# Patient Record
Sex: FEMALE | Race: WHITE | Employment: PART TIME | URBAN - METROPOLITAN AREA
[De-identification: names, ages, dates, MRNs, and addresses within clinical notes are randomized per-mention and may not be internally consistent; named-entity substitution may affect disease eponyms.]

---

## 2017-11-02 ENCOUNTER — OFFICE VISIT (OUTPATIENT)
Dept: SURGERY | Age: 47
End: 2017-11-02

## 2017-11-02 VITALS
WEIGHT: 153 LBS | HEIGHT: 64 IN | SYSTOLIC BLOOD PRESSURE: 120 MMHG | DIASTOLIC BLOOD PRESSURE: 80 MMHG | BODY MASS INDEX: 26.12 KG/M2

## 2017-11-02 DIAGNOSIS — R10.31 RLQ ABDOMINAL PAIN: ICD-10-CM

## 2017-11-02 DIAGNOSIS — K40.91 RECURRENT RIGHT INGUINAL HERNIA: ICD-10-CM

## 2017-11-02 DIAGNOSIS — R10.31 RLQ ABDOMINAL PAIN: Primary | ICD-10-CM

## 2017-11-02 LAB
ANION GAP SERPL CALCULATED.3IONS-SCNC: 15 MMOL/L (ref 3–16)
BASOPHILS ABSOLUTE: 0.1 K/UL (ref 0–0.2)
BASOPHILS RELATIVE PERCENT: 0.7 %
BUN BLDV-MCNC: 12 MG/DL (ref 7–20)
CALCIUM SERPL-MCNC: 9.4 MG/DL (ref 8.3–10.6)
CHLORIDE BLD-SCNC: 99 MMOL/L (ref 99–110)
CO2: 26 MMOL/L (ref 21–32)
CREAT SERPL-MCNC: 0.7 MG/DL (ref 0.6–1.1)
EOSINOPHILS ABSOLUTE: 0.3 K/UL (ref 0–0.6)
EOSINOPHILS RELATIVE PERCENT: 3.9 %
GFR AFRICAN AMERICAN: >60
GFR NON-AFRICAN AMERICAN: >60
GLUCOSE BLD-MCNC: 76 MG/DL (ref 70–99)
HCT VFR BLD CALC: 43.2 % (ref 36–48)
HEMOGLOBIN: 14.4 G/DL (ref 12–16)
LYMPHOCYTES ABSOLUTE: 3.1 K/UL (ref 1–5.1)
LYMPHOCYTES RELATIVE PERCENT: 36.2 %
MCH RBC QN AUTO: 32.5 PG (ref 26–34)
MCHC RBC AUTO-ENTMCNC: 33.4 G/DL (ref 31–36)
MCV RBC AUTO: 97.3 FL (ref 80–100)
MONOCYTES ABSOLUTE: 0.5 K/UL (ref 0–1.3)
MONOCYTES RELATIVE PERCENT: 5.4 %
NEUTROPHILS ABSOLUTE: 4.6 K/UL (ref 1.7–7.7)
NEUTROPHILS RELATIVE PERCENT: 53.8 %
PDW BLD-RTO: 12.9 % (ref 12.4–15.4)
PLATELET # BLD: 261 K/UL (ref 135–450)
PMV BLD AUTO: 10 FL (ref 5–10.5)
POTASSIUM SERPL-SCNC: 4.3 MMOL/L (ref 3.5–5.1)
RBC # BLD: 4.44 M/UL (ref 4–5.2)
SODIUM BLD-SCNC: 140 MMOL/L (ref 136–145)
WBC # BLD: 8.6 K/UL (ref 4–11)

## 2017-11-02 PROCEDURE — 99243 OFF/OP CNSLTJ NEW/EST LOW 30: CPT | Performed by: SURGERY

## 2017-11-02 RX ORDER — NAPROXEN 500 MG/1
500 TABLET ORAL 2 TIMES DAILY WITH MEALS
COMMUNITY

## 2017-11-02 RX ORDER — NORETHINDRONE ACETATE AND ETHINYL ESTRADIOL 1MG-20(21)
1 KIT ORAL DAILY
COMMUNITY

## 2017-11-02 ASSESSMENT — ENCOUNTER SYMPTOMS
RESPIRATORY NEGATIVE: 1
ALLERGIC/IMMUNOLOGIC NEGATIVE: 1
GASTROINTESTINAL NEGATIVE: 1
EYES NEGATIVE: 1

## 2017-11-02 NOTE — PROGRESS NOTES
Negative. Genitourinary: Negative. Musculoskeletal: Negative. Skin: Negative. Allergic/Immunologic: Negative. Neurological: Negative. Hematological: Negative. Psychiatric/Behavioral: Negative. PHYSICAL EXAM:  VITALS:  /80   Ht 5' 4\" (1.626 m)   Wt 153 lb (69.4 kg)   BMI 26.26 kg/m²   CONSTITUTIONAL:  alert, no apparent distress and normal weight  EYES:  sclera clear  ENT:  normocepalic, without obvious abnormality  NECK:  supple, symmetrical, trachea midline  LUNGS:  clear to auscultation, no crackles or wheezing  CARDIOVASCULAR:  regular rate and rhythm  ABDOMEN:  scars noted are healed, normal bowel sounds, soft, non-distended, tenderness noted in the right lower quadrant and in the left lower quadrant, mildly voluntary guarding absent, no masses palpated, no hepatosplenomegally and hernia possible, recurrent right, has tenderness and scar / mesh in the area  MUSCULOSKELETAL:  0+ edema lower extremities  NEUROLOGIC:  Mental Status Exam:  Level of Alertness:   awake  Orientation:   person, place, time  SKIN:  no bruising or bleeding, normal skin color, texture, turgor, no redness, warmth, or swelling and no jaundice    DATA:     Ref.  Range 11/2/2017 13:15   Sodium Latest Ref Range: 136 - 145 mmol/L 140   Potassium Latest Ref Range: 3.5 - 5.1 mmol/L 4.3   Chloride Latest Ref Range: 99 - 110 mmol/L 99   CO2 Latest Ref Range: 21 - 32 mmol/L 26   BUN Latest Ref Range: 7 - 20 mg/dL 12   Creatinine Latest Ref Range: 0.6 - 1.1 mg/dL 0.7   Anion Gap Latest Ref Range: 3 - 16  15   GFR Non- Latest Ref Range: >60  >60   GFR  Latest Ref Range: >60  >60   Glucose Latest Ref Range: 70 - 99 mg/dL 76   Calcium Latest Ref Range: 8.3 - 10.6 mg/dL 9.4   WBC Latest Ref Range: 4.0 - 11.0 K/uL 8.6   RBC Latest Ref Range: 4.00 - 5.20 M/uL 4.44   Hemoglobin Quant Latest Ref Range: 12.0 - 16.0 g/dL 14.4   Hematocrit Latest Ref Range: 36.0 - 48.0 % 43.2   MCV Latest Ref

## 2017-11-16 ENCOUNTER — HOSPITAL ENCOUNTER (OUTPATIENT)
Dept: CT IMAGING | Age: 47
Discharge: HOME | End: 2017-11-16
Attending: SURGERY | Admitting: SURGERY

## 2017-11-16 DIAGNOSIS — R10.31 RIGHT LOWER QUADRANT PAIN: ICD-10-CM

## 2017-11-16 DIAGNOSIS — R10.31 RLQ ABDOMINAL PAIN: ICD-10-CM

## 2017-11-28 ENCOUNTER — OFFICE VISIT (OUTPATIENT)
Dept: SURGERY | Age: 47
End: 2017-11-28

## 2017-11-28 VITALS — WEIGHT: 154 LBS | BODY MASS INDEX: 26.43 KG/M2 | DIASTOLIC BLOOD PRESSURE: 68 MMHG | SYSTOLIC BLOOD PRESSURE: 116 MMHG

## 2017-11-28 DIAGNOSIS — R10.31 RLQ ABDOMINAL PAIN: ICD-10-CM

## 2017-11-28 DIAGNOSIS — R18.8 GROIN FLUID COLLECTION: Primary | ICD-10-CM

## 2017-11-28 PROCEDURE — 99213 OFFICE O/P EST LOW 20 MIN: CPT | Performed by: SURGERY

## 2017-11-28 NOTE — PROGRESS NOTES
[facility] GENERAL AND LAPAROSCOPIC SURGERY          PATIENT NAME: Andrew Rhodes     TODAY'S DATE: 11/28/2017    SUBJECTIVE:    Pt with continued right groin pain. No F/C. No skin change or drainage. Feels mass still in the area. Nl GI, .      OBJECTIVE:  VITALS:  /68   Wt 154 lb (69.9 kg)   BMI 26.43 kg/m²     CONSTITUTIONAL:  awake and alert  LUNGS:  clear to auscultation  ABDOMEN:  normal bowel sounds, soft, non-distended, tenderness noted right inguinal, localized groin mass present, tender     Data:    Radiology Review:  CT scan - reviewed with pt  Right groin fluid collection present      ASSESSMENT AND PLAN:  Fluid collection, seroma - right groin  Will aspirate today as large and symptomatic- 18 G - 10 ml aspirated  No hernia recurrence  See back in 6 weeks to recheck      [employer]

## 2017-12-12 ENCOUNTER — OFFICE VISIT (OUTPATIENT)
Dept: ORTHOPEDIC SURGERY | Age: 47
End: 2017-12-12

## 2017-12-12 VITALS
SYSTOLIC BLOOD PRESSURE: 114 MMHG | HEIGHT: 64 IN | DIASTOLIC BLOOD PRESSURE: 73 MMHG | HEART RATE: 74 BPM | BODY MASS INDEX: 26.46 KG/M2 | WEIGHT: 155 LBS | RESPIRATION RATE: 15 BRPM

## 2017-12-12 DIAGNOSIS — M79.671 BILATERAL FOOT PAIN: ICD-10-CM

## 2017-12-12 DIAGNOSIS — M79.672 BILATERAL FOOT PAIN: ICD-10-CM

## 2017-12-12 DIAGNOSIS — M79.672 LEFT FOOT PAIN: Primary | ICD-10-CM

## 2017-12-12 PROCEDURE — 73630 X-RAY EXAM OF FOOT: CPT | Performed by: ORTHOPAEDIC SURGERY

## 2017-12-12 PROCEDURE — 99203 OFFICE O/P NEW LOW 30 MIN: CPT | Performed by: ORTHOPAEDIC SURGERY

## 2017-12-12 RX ORDER — TRAMADOL HYDROCHLORIDE 50 MG/1
50 TABLET ORAL EVERY 8 HOURS PRN
Qty: 21 TABLET | Refills: 0 | Status: SHIPPED | OUTPATIENT
Start: 2017-12-12

## 2017-12-12 NOTE — PROGRESS NOTES
CHIEF COMPLAINT:   1-Right posterior heel pain/Justine's deformity with insertinal Achillis tendenosis. 2-Right ankle pain/ Ankle sprain. 3- Left foot pain/ possible 3rd MT stress fracture    HISTORY:  Ms. Zak Alvarez 52 y.o.  female presents today for the first visit for evaluation of right posterior heel pain and right lateral ankle pain which started after she fell down 2 steps in her garage in 7/2017. She had swelling and pain at the time with bruising. She still has swelling, but the bruising has improved.   She is complaining of achy  pain. Pain is increase with standing and walking and shoe wear. Rates pain a 7/10 VAS constant throbbing achy. Pain is sharp early in the morning with first few steps, dull achy pain by the end of the day. Two months after her fall in the garage, she developed increased pain in her left forefoot, plantar surface and now more on the top of her distal foot. Pain is sharp, constant and aching and not improving. Rates pain a 10/10 VAS. She went to urgent care on 11/24 and was told she had a strain. No radiation and no numbness and tingling sensation. No other complaint. 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 No past medical history on file. Past Surgical History:   Procedure Laterality Date    HERNIA REPAIR      HERNIA SURGERY    MANDIBLE FRACTURE SURGERY      2 jaw surgery TMJ PLUS 3 PIECE MAX    OVARY SURGERY      RIGHT       Social History     Social History    Marital status:      Spouse name: N/A    Number of children: N/A    Years of education: N/A     Occupational History    Not on file. Social History Main Topics    Smoking status: Never Smoker    Smokeless tobacco: Never Used    Alcohol use Yes    Drug use: Yes    Sexual activity: Not on file     Other Topics Concern    Not on file     Social History Narrative    No narrative on file       No family history on file.     Current Outpatient Prescriptions on File Prior to Visit   Medication Sig Dispense Refill    norethindrone-ethinyl estradiol (JUNEL FE 1/20) 1-20 MG-MCG per tablet Take 1 tablet by mouth daily      naproxen (NAPROSYN) 500 MG tablet Take 500 mg by mouth 2 times daily (with meals)       No current facility-administered medications on file prior to visit. Pertinent items are noted in HPI  Review of systems reviewed from Patient History Form dated on 12/12/2017 and available in the patient's chart under the Media tab. No change noted. PHYSICAL EXAMINATION:  Ms. Dawit Campos is a very pleasant 52 y.o.  female who presents today in no acute distress, awake, alert, and oriented. She is well dressed, nourished and  groomed. Patient with normal affect. Height is  5' 4\" (1.626 m), weight is 155 lb (70.3 kg), Body mass index is 26.61 kg/m². Resting respiratory rate is 16. Examination of the gait, showed that the patient walks heel-toe with a non-antalgic gait and no limp.  Examination of both ankles showing a good range of motion.  She has dorsiflexion to about 10 degrees bilaterally, which increased with knee flexion. She has intact sensation and good pedal pulses.  She has good strength in all four planes, including eversion, and has moderate tenderness on deep palpation over the right posterior calcaneal tubercle, with prominent Justine's compared to the other side.  The ankles are stable to drawer test bilaterally, equally.  She has moderate tenderness on deep palpation over the right ankle ATF. She has significant tenderness on deep palpation over the 3rd distal MT shaft left foot compared to the other side, with swelling. Pes cavus. IMAGING:  Xray's were reviewed.  3 views of the bilateral foot taken in office today, and showed no acute fracture. 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 Bilateral bunion. There is bony changes in the left 3rd MT distal shaft. Pes cavus.     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 2-Right ankle sprain  3-Left 3rd MT stress injury    PLAN: I discussed with the patient the treatment options. We recommended stretching exercises of the calf which was taught to the patient today. She will take NSAIDS Mobic. Use backless shoes. Since she has significant pain with bony changes in the left foot, recommend getting a MRI to further evaluate for stress fracture. Follow up results.        Sabino Olszewski, MD

## 2017-12-13 RX ORDER — MELOXICAM 7.5 MG/1
7.5 TABLET ORAL DAILY
Qty: 30 TABLET | Refills: 0 | Status: SHIPPED | OUTPATIENT
Start: 2017-12-13

## 2017-12-22 ENCOUNTER — HOSPITAL ENCOUNTER (OUTPATIENT)
Dept: MRI IMAGING | Age: 47
Discharge: HOME | End: 2017-12-22
Attending: ORTHOPAEDIC SURGERY | Admitting: ORTHOPAEDIC SURGERY

## 2017-12-22 DIAGNOSIS — M79.672 PAIN OF LEFT FOOT: ICD-10-CM

## 2017-12-22 DIAGNOSIS — M79.672 LEFT FOOT PAIN: ICD-10-CM

## 2017-12-26 DIAGNOSIS — M79.672 LEFT FOOT PAIN: ICD-10-CM

## 2017-12-26 DIAGNOSIS — M67.88 ACHILLES TENDINOSIS: Primary | ICD-10-CM

## 2017-12-26 NOTE — TELEPHONE ENCOUNTER
Patient calling to get her MRI results. She does not want to wait until Dr Kimberley Holt gets back. Please advise where to schedule and if asaf can see her.       Please call pt  [phone]

## 2017-12-28 RX ORDER — TRAMADOL HYDROCHLORIDE 50 MG/1
50 TABLET ORAL EVERY 8 HOURS PRN
Qty: 21 TABLET | Refills: 0 | Status: SHIPPED | OUTPATIENT
Start: 2017-12-28

## 2017-12-28 NOTE — TELEPHONE ENCOUNTER
Spoke with patient and advised that per SMA she had mild inflammation, no fracture and that he is suggesting PT. She was in agreement with this plan, she is in agreement with plan. She is also requesting a refill of tramadol. Patient will also need a follow up for 6 weeks, she did not want to make that appointment at this time.

## 2018-01-25 ENCOUNTER — OFFICE VISIT (OUTPATIENT)
Dept: SURGERY | Age: 48
End: 2018-01-25

## 2018-01-25 VITALS — BODY MASS INDEX: 25.75 KG/M2 | DIASTOLIC BLOOD PRESSURE: 70 MMHG | SYSTOLIC BLOOD PRESSURE: 119 MMHG | WEIGHT: 150 LBS

## 2018-01-25 DIAGNOSIS — R18.8 GROIN FLUID COLLECTION: Primary | ICD-10-CM

## 2018-01-25 PROCEDURE — 99213 OFFICE O/P EST LOW 20 MIN: CPT | Performed by: SURGERY

## 2018-02-27 ENCOUNTER — OFFICE VISIT (OUTPATIENT)
Dept: SURGERY | Age: 48
End: 2018-02-27

## 2018-02-27 VITALS — WEIGHT: 148 LBS | DIASTOLIC BLOOD PRESSURE: 72 MMHG | SYSTOLIC BLOOD PRESSURE: 118 MMHG | BODY MASS INDEX: 25.4 KG/M2

## 2018-02-27 DIAGNOSIS — R18.8 GROIN FLUID COLLECTION: Primary | ICD-10-CM

## 2018-02-27 PROCEDURE — 10160 PNXR ASPIR ABSC HMTMA BULLA: CPT | Performed by: SURGERY

## 2018-02-27 NOTE — PROGRESS NOTES
[facility] GENERAL AND LAPAROSCOPIC SURGERY          PATIENT NAME: Tom Jackson     TODAY'S DATE: 2/27/2018    SUBJECTIVE:    Pt with tender groin fluid recurrence. OBJECTIVE:  VITALS:  /72   Wt 148 lb (67.1 kg)   BMI 25.40 kg/m²     CONSTITUTIONAL:  awake and alert    ABDOMEN:  normal bowel sounds, soft, non-distended, tenderness noted in old right inguinal hernia region, with underlying fluid collection     Data:  CBC: No results for input(s): WBC, HGB, HCT, PLT in the last 72 hours. BMP:  No results for input(s): NA, K, CL, CO2, BUN, CREATININE, GLUCOSE in the last 72 hours. Hepatic: No results for input(s): AST, ALT, ALB, BILITOT, ALKPHOS in the last 72 hours. Mag:    No results for input(s): MG in the last 72 hours. Phos:   No results for input(s): PHOS in the last 72 hours. INR: No results for input(s): INR in the last 72 hours. Radiology Review:  No new studies      ASSESSMENT AND PLAN:  Right groin fluid collection / seroma    Aspirate today, pt to try to add compression also  See back and reaspirate again if recurs.  Then if were to recur after that would consider surgical approach  DW pt - agrees with plan    Jose Talya      Procedure  Right groin site needle aspiration  Alcohol prep  No local  35 ml serous fluid aspirated with 21 G needle  Appeared noninfectious  PSO / DSD placed  Pt frank well, felt better thereafter with site decompressed    Jose Talya

## 2018-03-08 ENCOUNTER — TELEPHONE (OUTPATIENT)
Dept: ORTHOPEDIC SURGERY | Age: 48
End: 2018-03-08

## 2018-03-08 NOTE — TELEPHONE ENCOUNTER
Called  Lm/voicemail that disc is ready to be picked up at [facility] .

## 2018-10-02 ENCOUNTER — OFFICE VISIT (OUTPATIENT)
Dept: ORTHOPEDIC SURGERY | Age: 48
End: 2018-10-02
Payer: COMMERCIAL

## 2018-10-02 VITALS
SYSTOLIC BLOOD PRESSURE: 113 MMHG | DIASTOLIC BLOOD PRESSURE: 78 MMHG | HEIGHT: 64 IN | BODY MASS INDEX: 24.75 KG/M2 | HEART RATE: 68 BPM | WEIGHT: 145 LBS

## 2018-10-02 DIAGNOSIS — M79.672 LEFT FOOT PAIN: ICD-10-CM

## 2018-10-02 DIAGNOSIS — M25.571 CHRONIC PAIN OF RIGHT ANKLE: ICD-10-CM

## 2018-10-02 DIAGNOSIS — G89.29 CHRONIC PAIN OF RIGHT ANKLE: ICD-10-CM

## 2018-10-02 DIAGNOSIS — M10.472 OTHER SECONDARY ACUTE GOUT OF LEFT FOOT: Primary | ICD-10-CM

## 2018-10-02 LAB — URIC ACID, SERUM: 3.7 MG/DL (ref 2.6–6)

## 2018-10-02 PROCEDURE — 99214 OFFICE O/P EST MOD 30 MIN: CPT | Performed by: ORTHOPAEDIC SURGERY

## 2018-10-02 RX ORDER — METHYLPREDNISOLONE 4 MG/1
TABLET ORAL
Qty: 1 KIT | Refills: 0 | Status: SHIPPED | OUTPATIENT
Start: 2018-10-02

## 2018-10-02 RX ORDER — INDOMETHACIN 25 MG/1
75 CAPSULE ORAL 2 TIMES DAILY WITH MEALS
Qty: 126 CAPSULE | Refills: 0 | Status: SHIPPED | OUTPATIENT
Start: 2018-10-02

## 2018-10-03 NOTE — PROGRESS NOTES
CHIEF COMPLAINT: Bilateral great toe pain and swelling/gout. HISTORY:  Ms. Alejandro Hsu 50 y.o.  female presents today for the first visit for evaluation of bilateral great toe pain and swelling which started > 1 year ago. She is complaining of achy burning pain in her forefoot. Rates pain 8/10 VAS and is worsening. She has to crawl in the morning d/t pain. She is unable to wear regular shoes d/t swelling. Pain is increase with standing and walking and shoe wear. Pain is sharp early in the morning with first few steps, dull achy pain by the end of the day. No radiation and no numbness and tingling sensation. No other complaint. Denies past h/o gout. She is well known to me for a Right haglunds deformity with insertional achillis teninosis, right ankle sprain and left foot 3rd MT stress fracture in 12/2017. She was unable to get in sooner as she did not have insurance. History reviewed. No pertinent past medical history. Past Surgical History:   Procedure Laterality Date    HERNIA REPAIR      HERNIA SURGERY    MANDIBLE FRACTURE SURGERY      2 jaw surgery TMJ PLUS 3 PIECE MAX    OVARY SURGERY      RIGHT       Social History     Social History    Marital status:      Spouse name: N/A    Number of children: N/A    Years of education: N/A     Occupational History    Not on file. Social History Main Topics    Smoking status: Never Smoker    Smokeless tobacco: Never Used    Alcohol use Yes    Drug use: Yes    Sexual activity: Not on file     Other Topics Concern    Not on file     Social History Narrative    No narrative on file       History reviewed. No pertinent family history. Current Outpatient Prescriptions on File Prior to Visit   Medication Sig Dispense Refill    traMADol (ULTRAM) 50 MG tablet Take 1 tablet by mouth every 8 hours as needed for Pain .  21 tablet 0    meloxicam (MOBIC) 7.5 MG tablet Take 1 tablet by mouth daily 30 tablet 0    traMADol (ULTRAM) 50 MG tablet Take 1 tablet by mouth every 8 hours as needed for Pain . 21 tablet 0    norethindrone-ethinyl estradiol (JUNEL FE 1/20) 1-20 MG-MCG per tablet Take 1 tablet by mouth daily      naproxen (NAPROSYN) 500 MG tablet Take 500 mg by mouth 2 times daily (with meals)       No current facility-administered medications on file prior to visit. Pertinent items are noted in HPI  Review of systems reviewed from Patient History Form dated on 12/12/2017 and available in the patient's chart under the Media tab. No change noted. PHYSICAL EXAMINATION:  Ms. Christy Saravia is a very pleasant 50 y.o.  female who presents today in no acute distress, awake, alert, and oriented. She is well dressed, nourished and  groomed. Patient with normal affect. Height is  5' 4\" (1.626 m), weight is 145 lb (65.8 kg), Body mass index is 24.89 kg/m². Resting respiratory rate is 16. Examination of the gait, showed that the patient walks heel-toe with a non-antalgic gait and no limp. Examination of both ankles showing a good range of motion. She has dorsiflexion to about 10 degrees bilaterally, which increased with knee flexion. She has intact sensation and good pedal pulses. She has good strength in all four planes, including eversion, and has moderate tenderness on deep palpation over the bilateral great toe MPJ with significant swelling and erythema, with prominent dorsal osteophytes. The ankles are stable to drawer test bilaterally, equally. Ankle reflex 1+ bilaterally. IMAGING:  Xray's were reviewed. 3 views of the left foot taken in office today, and showed no acute fracture. Hallux rigidus with arthritis and dorsal osteophytes. There are cysts in the 1st distal MT head. X-rays were taken in the office today, 3 views of the right ankle, and showed no fracture. No other abnormality. IMPRESSION: Bilateral foot gout    PLAN: I discussed with the patient the treatment options.   We recommended

## 2018-10-08 ENCOUNTER — TELEPHONE (OUTPATIENT)
Dept: ORTHOPEDIC SURGERY | Age: 48
End: 2018-10-08

## 2018-10-08 NOTE — TELEPHONE ENCOUNTER
Called and left message for patient to call office back    **SMA currently out of office will need to speak with him tomorrow in regards to this.  patient also needs to make follow up appt for next week er OV note**

## 2018-10-08 NOTE — TELEPHONE ENCOUNTER
Patient called back and I relayed message to patient  She was very upset because SMA is not in the office today and I relayed to her that per message we have to wait until he is in so he can go over the results    Patient states that she is in lot of pain and the reason for the labs was to r/o gout  I explained again I understood this , but unfortunately provider is out until tomorrow    Pls call her ASAP tomorrow when  has reviewed the results [phone]

## 2018-10-19 PROBLEM — M25.571 CHRONIC PAIN OF RIGHT ANKLE: Status: ACTIVE | Noted: 2018-10-19

## 2018-10-19 PROBLEM — G89.29 CHRONIC PAIN OF RIGHT ANKLE: Status: ACTIVE | Noted: 2018-10-19

## 2018-10-19 PROBLEM — M10.9 GOUT: Status: ACTIVE | Noted: 2018-10-19

## 2019-01-10 ENCOUNTER — TELEPHONE (OUTPATIENT)
Dept: ORTHOPEDIC SURGERY | Age: 49
End: 2019-01-10

## 2019-10-25 LAB
HPV COMMENT: NORMAL
HPV TYPE 16: NOT DETECTED
HPV TYPE 18: NOT DETECTED
HPVOH (OTHER TYPES): NOT DETECTED

## 2022-12-08 ENCOUNTER — OFFICE VISIT (OUTPATIENT)
Dept: SURGERY | Age: 52
End: 2022-12-08

## 2022-12-08 VITALS — DIASTOLIC BLOOD PRESSURE: 60 MMHG | BODY MASS INDEX: 21.97 KG/M2 | SYSTOLIC BLOOD PRESSURE: 99 MMHG | WEIGHT: 128 LBS

## 2022-12-08 DIAGNOSIS — R19.09 MASS OF RIGHT INGUINAL REGION: Primary | ICD-10-CM

## 2022-12-08 RX ORDER — ADALIMUMAB 40MG/0.4ML
KIT SUBCUTANEOUS
COMMUNITY
Start: 2022-12-02

## 2022-12-08 RX ORDER — DICLOFENAC SODIUM 75 MG/1
TABLET, DELAYED RELEASE ORAL 2 TIMES DAILY
COMMUNITY

## 2022-12-08 ASSESSMENT — ENCOUNTER SYMPTOMS
CHEST TIGHTNESS: 0
EYE ITCHING: 0
BACK PAIN: 0
COLOR CHANGE: 0
ABDOMINAL DISTENTION: 0
APNEA: 0
EYE DISCHARGE: 0
ABDOMINAL PAIN: 0

## 2022-12-08 NOTE — PROGRESS NOTES
[facility] GENERAL AND LAPAROSCOPIC SURGERY                       PATIENT NAME: Tsering Stovall        TODAY'S DATE: 12/8/2022    Reason for Consult:  Inguinal swelling    Requesting Physician:  Self    HISTORY OF PRESENT ILLNESS:              The patient is a 46 y.o. female who presents with right groin swelling, has pressure and feels enlargement of the area. Pt has had a prior open RIH repair [facility]) and has had chronic seroma in the area. Was aspirated several years ago. Concern with recurrent issue. Pt without skin color change or drainage. No fevers. Past Medical History:    No past medical history on file. Past Surgical History:        Procedure Laterality Date    HERNIA REPAIR      HERNIA SURGERY    MANDIBLE FRACTURE SURGERY      2 jaw surgery TMJ PLUS 3 PIECE MAX    OVARY SURGERY      RIGHT       Current Medications:   No current facility-administered medications for this visit. Prior to Admission medications    Medication Sig Start Date End Date Taking? Authorizing Provider   methotrexate (RHEUMATREX) 2.5 MG chemo tablet TAKE 6 TABLETS BY MOUTH WEEKLY 10/13/22  Yes Historical Provider, MD   diclofenac (VOLTAREN) 75 MG EC tablet Take by mouth 2 times daily   Yes Historical Provider, MD   HUMIRA PEN 40 MG/0.4ML PNKT  12/2/22  Yes Historical Provider, MD        Allergies:  Patient has no known allergies. Social History:    reports that she has never smoked. She has never used smokeless tobacco. She reports current alcohol use. She reports current drug use. Family History:    No family history on file. REVIEW OF SYSTEMS:  Review of Systems   Constitutional:  Negative for activity change and appetite change. HENT:  Negative for congestion and dental problem. Eyes:  Negative for discharge and itching. Respiratory:  Negative for apnea and chest tightness. Cardiovascular:  Negative for chest pain and leg swelling.    Gastrointestinal:  Negative for abdominal distention and

## 2022-12-30 ENCOUNTER — HOSPITAL ENCOUNTER (OUTPATIENT)
Dept: CT IMAGING | Age: 52
Discharge: HOME | End: 2022-12-30
Payer: COMMERCIAL

## 2022-12-30 DIAGNOSIS — R19.09 MASS OF RIGHT INGUINAL REGION: ICD-10-CM

## 2022-12-30 PROCEDURE — 6360000004 HC RX CONTRAST MEDICATION: Performed by: SURGERY

## 2022-12-30 PROCEDURE — 74178 CT ABD&PLV WO CNTR FLWD CNTR: CPT

## 2022-12-30 RX ADMIN — IOPAMIDOL 75 ML: 755 INJECTION, SOLUTION INTRAVENOUS at 10:50

## 2022-12-30 RX ADMIN — DIATRIZOATE MEGLUMINE AND DIATRIZOATE SODIUM 20 ML: 660; 100 LIQUID ORAL; RECTAL at 10:50

## 2023-01-02 NOTE — RESULT ENCOUNTER NOTE
Ct reviewed. Pt has a seroma present, no recurrent hernia.   Call and set up for out pt drainage of right groin seroma in the OR  Thanks  Nell Nageotte

## 2023-01-05 ENCOUNTER — TELEPHONE (OUTPATIENT)
Dept: SURGERY | Age: 53
End: 2023-01-05

## 2023-01-05 NOTE — TELEPHONE ENCOUNTER
Pt would like to know her CT results. Pt states she needs it possibly drained?  Please call [phone]

## 2023-01-18 RX ORDER — FOLIC ACID 1 MG/1
1 TABLET ORAL DAILY
COMMUNITY

## 2023-01-18 NOTE — PROGRESS NOTES
Name_______________________________________Printed:____________________  Date and time of surgery_1/20/2023   1115_______________________Arrival Time:__0945  main______________   1. The instructions given regarding when and if a patient needs to stop oral intake prior to surgery varies. Follow the specific instructions you were given                  __x_Nothing to eat or to drink after Midnight the night before.                   ____Carbo loading or ERAS instructions will be given to select patients-if you have been given those instructions -please do the following                           The evening before your surgery after dinner before midnight drink 40 ounces of gatorade. If you are diabetic use sugar free. The morning of surgery drink 40 ounces of water. This needs to be finished 3 hours prior to your surgery start time. 2. Take the following pills with a small sip of water on the morning of surgery__none_________________________________________________                  Do not take blood pressure medications ending in pril or sartan the dayan prior to surgery or the morning of surgery. Dr Jordyn Arango patient are not to take any medications the AM of surgery. 3. Aspirin, Ibuprofen, Advil, Naproxen, Vitamin E and other Anti-inflammatory products and supplements should be stopped for 5 -7days before surgery or as directed by your physician. 4. Check with your Doctor regarding stopping Plavix, Coumadin,Eliquis, Lovenox,Effient,Pradaxa,Xarelto, Fragmin or other blood thinners and follow their instructions. 5. Do not smoke, and do not drink any alcoholic beverages 24 hours prior to surgery. This includes NA Beer. Refrain from the usage of any recreational drugs. 6. You may brush your teeth and gargle the morning of surgery. DO NOT SWALLOW WATER   7. You MUST make arrangements for a responsible adult to stay on site while you are here and take you home after your surgery.  You will not be allowed to leave alone or drive yourself home. It is strongly suggested someone stay with you the first 24 hrs. Your surgery will be cancelled if you do not have a ride home. 8. A parent/legal guardian must accompany a child scheduled for surgery and plan to stay at the hospital until the child is discharged. Please do not bring other children with you. 9. Please wear simple, loose fitting clothing to the hospital.  Tamera Him not bring valuables (money, credit cards, checkbooks, etc.) Do not wear any makeup (including no eye makeup) or nail polish on your fingers or toes. 10. DO NOT wear any jewelry or piercings on day of surgery. All body piercing jewelry must be removed. 11. If you have ___dentures, they will be removed before going to the OR; we will provide you a container. If you wear ___contact lenses or ___glasses, they will be removed; please bring a case for them. 12. Please see your family doctor/pediatrician for a history & physical and/or concerning medications. Bring any test results/reports from your physician's office. PCP__________________Phone___________H&P Appt. Date________             13 If you  have a Living Will and Durable Power of  for Healthcare, please bring in a copy. 15. Notify your Surgeon if you develop any illness between now and surgery  time, cough, cold, fever, sore throat, nausea, vomiting, etc.  Please notify your surgeon if you experience dizziness, shortness of breath or blurred vision between now & the time of your surgery             15. DO NOT shave your operative site 96 hours prior to surgery. For face & neck surgery, men may use an electric razor 48 hours prior to surgery. 16. Shower the night before or morning of surgery using an antibacterial soap or as you have been instructed. 17. To provide excellent care visitors will be limited to one in the room at any given time.              18.  Please bring picture ID and insurance card. 19.  Visit our web site for additional information:  [email or web address]              20.During flu season no children under the age of 15 are permitted in the hospital for the safety of all patients. 21. If you take a long acting insulin in the evening only  take half of your usual  dose the night  before your procedure              22. If you use a c-pap please bring DOS if staying overnight,             23.For your convenience [id number] [facility] has a pharmacy on site to fill your prescriptions. 24. If you use oxygen and have a portable tank please bring it  with you the DOS             25. Bring a complete list of all your medications with name and dose include any supplements. 26. Other__________________________________________   *Please call pre admission testing if you any further questions   [facility]   [address]    [place] [id number]. [facility]  [phone]   [address] [place] [address]       VISITOR POLICY(subject to change)    Current policy is 2 visitors per patient. No children. Mask is  at the discretion of the facility. Visiting hours are 8a-8p. Overnight visitors will be at the discretion of the nurse. All policies subject to change. All above information reviewed with patient in person or by phone. Patient verbalizes understanding. All questions and concerns addressed.                                                                                                  Patient/Rep_patient___________________                                                                                                                                    PRE OP INSTRUCTIONS

## 2023-01-20 ENCOUNTER — ANESTHESIA (OUTPATIENT)
Dept: OPERATING ROOM | Age: 53
End: 2023-01-20
Payer: COMMERCIAL

## 2023-01-20 ENCOUNTER — HOSPITAL ENCOUNTER (OUTPATIENT)
Age: 53
Setting detail: OUTPATIENT SURGERY
Discharge: HOME | End: 2023-01-20
Attending: SURGERY | Admitting: SURGERY
Payer: COMMERCIAL

## 2023-01-20 ENCOUNTER — ANESTHESIA EVENT (OUTPATIENT)
Dept: OPERATING ROOM | Age: 53
End: 2023-01-20
Payer: COMMERCIAL

## 2023-01-20 VITALS
WEIGHT: 125 LBS | DIASTOLIC BLOOD PRESSURE: 77 MMHG | HEIGHT: 63 IN | RESPIRATION RATE: 16 BRPM | SYSTOLIC BLOOD PRESSURE: 115 MMHG | HEART RATE: 93 BPM | BODY MASS INDEX: 22.15 KG/M2 | OXYGEN SATURATION: 98 % | TEMPERATURE: 97.1 F

## 2023-01-20 DIAGNOSIS — K41.90 FEMORAL HERNIA OF RIGHT SIDE: Primary | ICD-10-CM

## 2023-01-20 PROCEDURE — 3700000000 HC ANESTHESIA ATTENDED CARE: Performed by: SURGERY

## 2023-01-20 PROCEDURE — 6370000000 HC RX 637 (ALT 250 FOR IP): Performed by: ANESTHESIOLOGY

## 2023-01-20 PROCEDURE — C1781 MESH (IMPLANTABLE): HCPCS | Performed by: SURGERY

## 2023-01-20 PROCEDURE — 3700000001 HC ADD 15 MINUTES (ANESTHESIA): Performed by: SURGERY

## 2023-01-20 PROCEDURE — A4217 STERILE WATER/SALINE, 500 ML: HCPCS | Performed by: SURGERY

## 2023-01-20 PROCEDURE — 3600000002 HC SURGERY LEVEL 2 BASE: Performed by: SURGERY

## 2023-01-20 PROCEDURE — 2580000003 HC RX 258: Performed by: SURGERY

## 2023-01-20 PROCEDURE — 7100000010 HC PHASE II RECOVERY - FIRST 15 MIN: Performed by: SURGERY

## 2023-01-20 PROCEDURE — 7100000011 HC PHASE II RECOVERY - ADDTL 15 MIN: Performed by: SURGERY

## 2023-01-20 PROCEDURE — 2709999900 HC NON-CHARGEABLE SUPPLY: Performed by: SURGERY

## 2023-01-20 PROCEDURE — 7100000001 HC PACU RECOVERY - ADDTL 15 MIN: Performed by: SURGERY

## 2023-01-20 PROCEDURE — 2580000003 HC RX 258: Performed by: ANESTHESIOLOGY

## 2023-01-20 PROCEDURE — 7100000000 HC PACU RECOVERY - FIRST 15 MIN: Performed by: SURGERY

## 2023-01-20 PROCEDURE — 6360000002 HC RX W HCPCS: Performed by: SURGERY

## 2023-01-20 PROCEDURE — 2500000003 HC RX 250 WO HCPCS: Performed by: NURSE ANESTHETIST, CERTIFIED REGISTERED

## 2023-01-20 PROCEDURE — 49553 RPR FEM HERNIA INIT BLOCKED: CPT | Performed by: SURGERY

## 2023-01-20 PROCEDURE — 3600000012 HC SURGERY LEVEL 2 ADDTL 15MIN: Performed by: SURGERY

## 2023-01-20 PROCEDURE — 6360000002 HC RX W HCPCS: Performed by: NURSE ANESTHETIST, CERTIFIED REGISTERED

## 2023-01-20 PROCEDURE — 6360000002 HC RX W HCPCS: Performed by: ANESTHESIOLOGY

## 2023-01-20 DEVICE — IMPLANTABLE DEVICE: Type: IMPLANTABLE DEVICE | Status: FUNCTIONAL

## 2023-01-20 RX ORDER — DEXAMETHASONE SODIUM PHOSPHATE 4 MG/ML
INJECTION, SOLUTION INTRA-ARTICULAR; INTRALESIONAL; INTRAMUSCULAR; INTRAVENOUS; SOFT TISSUE PRN
Status: DISCONTINUED | OUTPATIENT
Start: 2023-01-20 | End: 2023-01-20 | Stop reason: SDUPTHER

## 2023-01-20 RX ORDER — SODIUM CHLORIDE 9 MG/ML
INJECTION, SOLUTION INTRAVENOUS CONTINUOUS
Status: DISCONTINUED | OUTPATIENT
Start: 2023-01-20 | End: 2023-01-20 | Stop reason: HOSPADM

## 2023-01-20 RX ORDER — SODIUM CHLORIDE 0.9 % (FLUSH) 0.9 %
5-40 SYRINGE (ML) INJECTION EVERY 12 HOURS SCHEDULED
Status: CANCELLED | OUTPATIENT
Start: 2023-01-20

## 2023-01-20 RX ORDER — HYDROCODONE BITARTRATE AND ACETAMINOPHEN 5; 325 MG/1; MG/1
TABLET ORAL
Status: DISCONTINUED
Start: 2023-01-20 | End: 2023-01-20 | Stop reason: HOSPADM

## 2023-01-20 RX ORDER — SODIUM CHLORIDE, SODIUM LACTATE, POTASSIUM CHLORIDE, CALCIUM CHLORIDE 600; 310; 30; 20 MG/100ML; MG/100ML; MG/100ML; MG/100ML
INJECTION, SOLUTION INTRAVENOUS CONTINUOUS
Status: DISCONTINUED | OUTPATIENT
Start: 2023-01-20 | End: 2023-01-20 | Stop reason: HOSPADM

## 2023-01-20 RX ORDER — HYDROCODONE BITARTRATE AND ACETAMINOPHEN 5; 325 MG/1; MG/1
1 TABLET ORAL EVERY 4 HOURS PRN
Qty: 20 TABLET | Refills: 0 | Status: SHIPPED | OUTPATIENT
Start: 2023-01-20 | End: 2023-01-27

## 2023-01-20 RX ORDER — BUPIVACAINE HYDROCHLORIDE AND EPINEPHRINE 5; 5 MG/ML; UG/ML
INJECTION, SOLUTION PERINEURAL
Status: COMPLETED | OUTPATIENT
Start: 2023-01-20 | End: 2023-01-20

## 2023-01-20 RX ORDER — MIDAZOLAM HYDROCHLORIDE 1 MG/ML
INJECTION INTRAMUSCULAR; INTRAVENOUS PRN
Status: DISCONTINUED | OUTPATIENT
Start: 2023-01-20 | End: 2023-01-20 | Stop reason: SDUPTHER

## 2023-01-20 RX ORDER — HYDROCODONE BITARTRATE AND ACETAMINOPHEN 5; 325 MG/1; MG/1
1 TABLET ORAL ONCE
Status: COMPLETED | OUTPATIENT
Start: 2023-01-20 | End: 2023-01-20

## 2023-01-20 RX ORDER — PROPOFOL 10 MG/ML
INJECTION, EMULSION INTRAVENOUS PRN
Status: DISCONTINUED | OUTPATIENT
Start: 2023-01-20 | End: 2023-01-20 | Stop reason: SDUPTHER

## 2023-01-20 RX ORDER — LABETALOL HYDROCHLORIDE 5 MG/ML
5 INJECTION, SOLUTION INTRAVENOUS
Status: DISCONTINUED | OUTPATIENT
Start: 2023-01-20 | End: 2023-01-20 | Stop reason: HOSPADM

## 2023-01-20 RX ORDER — LIDOCAINE HYDROCHLORIDE 20 MG/ML
INJECTION, SOLUTION INFILTRATION; PERINEURAL PRN
Status: DISCONTINUED | OUTPATIENT
Start: 2023-01-20 | End: 2023-01-20 | Stop reason: SDUPTHER

## 2023-01-20 RX ORDER — ONDANSETRON 2 MG/ML
4 INJECTION INTRAMUSCULAR; INTRAVENOUS
Status: DISCONTINUED | OUTPATIENT
Start: 2023-01-20 | End: 2023-01-20 | Stop reason: HOSPADM

## 2023-01-20 RX ORDER — SODIUM CHLORIDE 9 MG/ML
INJECTION, SOLUTION INTRAVENOUS PRN
Status: CANCELLED | OUTPATIENT
Start: 2023-01-20

## 2023-01-20 RX ORDER — GLYCOPYRROLATE 0.2 MG/ML
INJECTION INTRAMUSCULAR; INTRAVENOUS PRN
Status: DISCONTINUED | OUTPATIENT
Start: 2023-01-20 | End: 2023-01-20 | Stop reason: SDUPTHER

## 2023-01-20 RX ORDER — SODIUM CHLORIDE 9 MG/ML
INJECTION, SOLUTION INTRAVENOUS PRN
Status: DISCONTINUED | OUTPATIENT
Start: 2023-01-20 | End: 2023-01-20 | Stop reason: HOSPADM

## 2023-01-20 RX ORDER — SODIUM CHLORIDE 0.9 % (FLUSH) 0.9 %
5-40 SYRINGE (ML) INJECTION PRN
Status: CANCELLED | OUTPATIENT
Start: 2023-01-20

## 2023-01-20 RX ORDER — SODIUM CHLORIDE 0.9 % (FLUSH) 0.9 %
5-40 SYRINGE (ML) INJECTION EVERY 12 HOURS SCHEDULED
Status: DISCONTINUED | OUTPATIENT
Start: 2023-01-20 | End: 2023-01-20 | Stop reason: HOSPADM

## 2023-01-20 RX ORDER — SODIUM CHLORIDE 0.9 % (FLUSH) 0.9 %
5-40 SYRINGE (ML) INJECTION PRN
Status: DISCONTINUED | OUTPATIENT
Start: 2023-01-20 | End: 2023-01-20 | Stop reason: HOSPADM

## 2023-01-20 RX ORDER — HYDROMORPHONE HCL 110MG/55ML
0.25 PATIENT CONTROLLED ANALGESIA SYRINGE INTRAVENOUS EVERY 5 MIN PRN
Status: DISCONTINUED | OUTPATIENT
Start: 2023-01-20 | End: 2023-01-20 | Stop reason: HOSPADM

## 2023-01-20 RX ORDER — FENTANYL CITRATE 50 UG/ML
INJECTION, SOLUTION INTRAMUSCULAR; INTRAVENOUS PRN
Status: DISCONTINUED | OUTPATIENT
Start: 2023-01-20 | End: 2023-01-20 | Stop reason: SDUPTHER

## 2023-01-20 RX ORDER — HYDROMORPHONE HCL 110MG/55ML
0.5 PATIENT CONTROLLED ANALGESIA SYRINGE INTRAVENOUS EVERY 5 MIN PRN
Status: DISCONTINUED | OUTPATIENT
Start: 2023-01-20 | End: 2023-01-20 | Stop reason: HOSPADM

## 2023-01-20 RX ORDER — MAGNESIUM HYDROXIDE 1200 MG/15ML
LIQUID ORAL CONTINUOUS PRN
Status: COMPLETED | OUTPATIENT
Start: 2023-01-20 | End: 2023-01-20

## 2023-01-20 RX ORDER — ONDANSETRON 2 MG/ML
INJECTION INTRAMUSCULAR; INTRAVENOUS PRN
Status: DISCONTINUED | OUTPATIENT
Start: 2023-01-20 | End: 2023-01-20 | Stop reason: SDUPTHER

## 2023-01-20 RX ORDER — LIDOCAINE HYDROCHLORIDE 10 MG/ML
1 INJECTION, SOLUTION EPIDURAL; INFILTRATION; INTRACAUDAL; PERINEURAL
Status: CANCELLED | OUTPATIENT
Start: 2023-01-20 | End: 2023-01-21

## 2023-01-20 RX ADMIN — ONDANSETRON 4 MG: 2 INJECTION INTRAMUSCULAR; INTRAVENOUS at 11:36

## 2023-01-20 RX ADMIN — FENTANYL CITRATE 50 MCG: 50 INJECTION, SOLUTION INTRAMUSCULAR; INTRAVENOUS at 12:13

## 2023-01-20 RX ADMIN — DEXAMETHASONE SODIUM PHOSPHATE 4 MG: 4 INJECTION, SOLUTION INTRAMUSCULAR; INTRAVENOUS at 11:35

## 2023-01-20 RX ADMIN — GLYCOPYRROLATE 0.2 MG: 0.2 INJECTION, SOLUTION INTRAMUSCULAR; INTRAVENOUS at 11:31

## 2023-01-20 RX ADMIN — PHENYLEPHRINE HYDROCHLORIDE 100 MCG: 10 INJECTION INTRAVENOUS at 11:50

## 2023-01-20 RX ADMIN — PHENYLEPHRINE HYDROCHLORIDE 100 MCG: 10 INJECTION INTRAVENOUS at 11:35

## 2023-01-20 RX ADMIN — HYDROMORPHONE HYDROCHLORIDE 0.5 MG: 2 INJECTION, SOLUTION INTRAMUSCULAR; INTRAVENOUS; SUBCUTANEOUS at 12:44

## 2023-01-20 RX ADMIN — HYDROCODONE BITARTRATE AND ACETAMINOPHEN 1 TABLET: 5; 325 TABLET ORAL at 13:43

## 2023-01-20 RX ADMIN — HYDROMORPHONE HYDROCHLORIDE 0.5 MG: 2 INJECTION, SOLUTION INTRAMUSCULAR; INTRAVENOUS; SUBCUTANEOUS at 13:00

## 2023-01-20 RX ADMIN — PROPOFOL 200 MG: 10 INJECTION, EMULSION INTRAVENOUS at 11:30

## 2023-01-20 RX ADMIN — PHENYLEPHRINE HYDROCHLORIDE 100 MCG: 10 INJECTION INTRAVENOUS at 11:45

## 2023-01-20 RX ADMIN — FENTANYL CITRATE 50 MCG: 50 INJECTION, SOLUTION INTRAMUSCULAR; INTRAVENOUS at 11:30

## 2023-01-20 RX ADMIN — CEFAZOLIN 2000 MG: 2 INJECTION, POWDER, FOR SOLUTION INTRAMUSCULAR; INTRAVENOUS at 11:23

## 2023-01-20 RX ADMIN — SODIUM CHLORIDE, POTASSIUM CHLORIDE, SODIUM LACTATE AND CALCIUM CHLORIDE: 600; 310; 30; 20 INJECTION, SOLUTION INTRAVENOUS at 10:30

## 2023-01-20 RX ADMIN — LIDOCAINE HYDROCHLORIDE 100 MG: 20 INJECTION, SOLUTION INFILTRATION; PERINEURAL at 11:30

## 2023-01-20 RX ADMIN — MIDAZOLAM 2 MG: 1 INJECTION INTRAMUSCULAR; INTRAVENOUS at 11:24

## 2023-01-20 RX ADMIN — PHENYLEPHRINE HYDROCHLORIDE 100 MCG: 10 INJECTION INTRAVENOUS at 11:40

## 2023-01-20 ASSESSMENT — PAIN DESCRIPTION - LOCATION
LOCATION: GROIN

## 2023-01-20 ASSESSMENT — PAIN SCALES - GENERAL
PAINLEVEL_OUTOF10: 7
PAINLEVEL_OUTOF10: 8
PAINLEVEL_OUTOF10: 8

## 2023-01-20 ASSESSMENT — PAIN DESCRIPTION - PAIN TYPE: TYPE: SURGICAL PAIN

## 2023-01-20 ASSESSMENT — PAIN - FUNCTIONAL ASSESSMENT: PAIN_FUNCTIONAL_ASSESSMENT: NONE - DENIES PAIN

## 2023-01-20 NOTE — DISCHARGE INSTRUCTIONS
Lou Villela M.D.    [phone]                                                     [place] [address] [facility] [address], [address]    Call the office to schedule your post-operative appointment with your surgeon for two (2) weeks. Your incisions are waterproof, you may shower. Place an ice pack over your incisions on and off (15min) at a time for the next 2 days. General guidelines for activity:      Avoid strenuous activity or lifting anything heavier than 15 pounds. It is OK to be up and walking around. Going up and down stairs is   also OK. Do what is comfortable: stop and rest when you feel tired. You will have pain medicine ordered. Take as directed. Do NOT drive for three days and while taking your narcotic pain medicine. Watch for signs of infection:    Excessive warmth or bright redness around your incisions    Leakage of bloody or cloudy fluid from you incisions    Fever over 100.5    If you experience constipation  Increase your water intake. Increase your activity; walking is best.  A stool softener or mild laxative may be necessary if you still have not had a bowel  movement ; call the office for further instructions. Dermabond TOPICAL SKIN ADHESIVE CARE    1/20/2023    Dermabond is a clear, sterile liquid skin adhesive that holds wound/incision edges together. The adhesive will usually remain in place for 5 to 10 days, then naturally wear or slough off your skin. Do not scratch, rub, or pick at the Skin Affix adhesive film. Do not apply liquid or ointment medications, soap, powders or lotions to the incision while the Dermabond is in place. You may shower 24 hours after your surgery and briefly wet the Dermabond. Do not sit in a tub of water or swim. Do not soak or scrub your incision. After showering, gently blot your incision dry. No tape or any type of bandage/covering is required over the Dermabond. ANESTHESIA DISCHARGE INSTRUCTIONS    Wear your seatbelt home. You are under the influence of drugs-do not drink alcohol, drive, operate machinery, make any important decisions or sign any legal documents for 24 hours. Children should not ride bikes, [place] or play on gym sets for 24 hours after surgery. A responsible adult needs to be with you for 24 hours. You may experience lightheadedness, dizziness, or sleepiness following surgery. Rest at home today- increase activity as tolerated. Progress slowly to a regular diet unless your physician has instructed you otherwise. Drink plenty of water. If persistent nausea and vomiting becomes a problem, call your physician. Coughing, sore throat and muscle aches are other side effects of anesthesia, and should improve with time. Do not drive or operate machinery while taking narcotics. Females of childbearing potential and on hormonal birth control, should use two forms of contraception following procedure if given a medication called sugammadex and/or emend. Additional contraception should be used for 7 days for sugammadex and/or 28 days for emend. These medications have a potential to reduce the effectiveness of hormonal birth control.

## 2023-01-20 NOTE — H&P
[facility] GENERAL AND LAPAROSCOPIC SURGERY                       PATIENT NAME: Lorrine Klinefelter     ADMISSION DATE: 1/20/2023  9:45 AM      TODAY'S DATE: 1/20/2023      HISTORY OF PRESENT ILLNESS:              The patient is a 46 y.o. female who presents with a recurrent right groin seroma. Past Medical History:        Diagnosis Date    Rheumatoid arthritis ([address]        Past Surgical History:        Procedure Laterality Date    HERNIA REPAIR Right     HERNIA SURGERY, open    MANDIBLE FRACTURE SURGERY  1984    2 jaw surgery TMJ PLUS 3 PIECE MAX    OVARY SURGERY  1994    RIGHT       Current Medications:   Current Facility-Administered Medications: lactated ringers IV soln infusion, , IntraVENous, Continuous  ceFAZolin (ANCEF) 2,000 mg in dextrose 5 % 50 mL IVPB (mini-bag), 2,000 mg, IntraVENous, On Call to OR  Prior to Admission medications    Medication Sig Start Date End Date Taking? Authorizing Provider   folic acid (FOLVITE) 1 MG tablet Take 1 mg by mouth daily   Yes Historical Provider, MD   methotrexate (RHEUMATREX) 2.5 MG chemo tablet weds 10/13/22   Historical Provider, MD   diclofenac (VOLTAREN) 75 MG EC tablet Take by mouth 2 times daily    Historical Provider, MD   HUMIRA PEN 40 MG/0.4ML PNKT 40 mg every 14 days Tuesday 12/2/22   Historical Provider, MD        Allergies:  Patient has no known allergies. Social History:    reports that she has never smoked. She has never used smokeless tobacco. She reports current alcohol use. She reports that she does not currently use drugs. Family History:    History reviewed. No pertinent family history.     REVIEW OF SYSTEMS:  CONSTITUTIONAL:  negative  HEENT:  negative  RESPIRATORY:  negative  CARDIOVASCULAR:  negative  GASTROINTESTINAL:  negative   GENITOURINARY:  negative  HEMATOLOGIC/LYMPHATIC:  negative  NEUROLOGICAL:  negative  SKIN: negative    PHYSICAL EXAM:  VITALS:  /87   Pulse 67   Temp (!) 96.5 °F (35.8 °C) (Temporal)   Resp 14 Ht 5' 3\" (1.6 m)   Wt 125 lb (56.7 kg)   LMP 07/07/2020   SpO2 100%   BMI 22.14 kg/m²   24HR INTAKE/OUTPUT:  No intake or output data in the 24 hours ending 01/20/23 1106  DRAIN/TUBE OUTPUT:     CONSTITUTIONAL:  alert, no apparent distress and normal weight  EYES:  sclera clear  ENT:  normocepalic, without obvious abnormality  NECK:  supple, symmetrical, trachea midline  LUNGS:  clear to auscultation  CARDIOVASCULAR:  regular rate and rhythm and no murmur noted  ABDOMEN:  scars noted right groin, normal bowel sounds, soft, non-distended, non-tender, voluntary guarding absent, right groin seroma mass is palpated and hernia absent  MUSCULOSKELETAL:  0+ pitting edema lower extremities  NEUROLOGIC:  Mental Status Exam:  Level of Alertness:   awake  Orientation:   person, place, time  SKIN:  no bruising or bleeding and normal skin color, texture, turgor      IMPRESSION/RECOMMENDATIONS:    Right groin seroma    Open drainage today  R/B/A reviewed, all Q answered, site marked, will proceed    Sangeetha Gómez MD

## 2023-01-20 NOTE — ANESTHESIA PRE PROCEDURE
Department of Anesthesiology  Preprocedure Note       Name:  Alexsander Banks   Age:  46 y.o.  :  1970                                          MRN:  [id number]         Date:  2023      Surgeon: Tammy Pacheco):  Fadi Juarez MD    Procedure: Procedure(s):  OPEN RIGHT GROIN SEROMA DRAINAGE    Medications prior to admission:   Prior to Admission medications    Medication Sig Start Date End Date Taking? Authorizing Provider   folic acid (FOLVITE) 1 MG tablet Take 1 mg by mouth daily   Yes Historical Provider, MD   methotrexate (RHEUMATREX) 2.5 MG chemo tablet weds 10/13/22   Historical Provider, MD   diclofenac (VOLTAREN) 75 MG EC tablet Take by mouth 2 times daily    Historical Provider, MD   HUMIRA PEN 40 MG/0.4ML PNKT 40 mg every 14 days 22   Historical Provider, MD       Current medications:    No current facility-administered medications for this encounter.        Allergies:  No Known Allergies    Problem List:    Patient Active Problem List   Diagnosis Code    Chronic pain of right ankle M25.571, G89.29    Gout M10.9       Past Medical History:        Diagnosis Date    Rheumatoid [address] ([facility] [address]        Past Surgical History:        Procedure Laterality Date    HERNIA REPAIR Right     HERNIA SURGERY, open    MANDIBLE FRACTURE SURGERY  1984    2 jaw surgery TMJ PLUS 3 PIECE MAX    OVARY SURGERY      RIGHT       Social History:    Social History     Tobacco Use    Smoking status: Never    Smokeless tobacco: Never   Substance Use Topics    Alcohol use: Yes     Comment: 2/ monthly                                Counseling given: Not Answered      Vital Signs (Current):   Vitals:    23 1200 23 1008   BP:  119/87   Pulse:  67   Resp:  14   Temp:  (!) 96.5 °F (35.8 °C)   TempSrc:  Temporal   SpO2:  100%   Weight: 122 lb (55.3 kg) 125 lb (56.7 kg)   Height: 5' 3\" (1.6 m)                                               BP Readings from Last 3 Encounters:   23 119/87   12/08/22 99/60   10/02/18 113/78       NPO Status: Time of last liquid consumption: 2345                        Time of last solid consumption: 2000                        Date of last liquid consumption: 01/19/23                        Date of last solid food consumption: 01/19/23    BMI:   Wt Readings from Last 3 Encounters:   01/20/23 125 lb (56.7 kg)   12/08/22 128 lb (58.1 kg)   10/02/18 145 lb (65.8 kg)     Body mass index is 22.14 kg/m². CBC:   Lab Results   Component Value Date/Time    WBC 8.6 11/02/2017 01:15 PM    RBC 4.44 11/02/2017 01:15 PM    HGB 14.4 11/02/2017 01:15 PM    HCT 43.2 11/02/2017 01:15 PM    MCV 97.3 11/02/2017 01:15 PM    RDW 12.9 11/02/2017 01:15 PM     11/02/2017 01:15 PM       CMP:   Lab Results   Component Value Date/Time     11/02/2017 01:15 PM    K 4.3 11/02/2017 01:15 PM    CL 99 11/02/2017 01:15 PM    CO2 26 11/02/2017 01:15 PM    BUN 12 11/02/2017 01:15 PM    CREATININE 0.7 11/02/2017 01:15 PM    GFRAA >60 11/02/2017 01:15 PM    LABGLOM >60 11/02/2017 01:15 PM    GLUCOSE 76 11/02/2017 01:15 PM    CALCIUM 9.4 11/02/2017 01:15 PM       POC Tests: No results for input(s): POCGLU, POCNA, POCK, POCCL, POCBUN, POCHEMO, POCHCT in the last 72 hours.     Coags: No results found for: PROTIME, INR, APTT    HCG (If Applicable): No results found for: PREGTESTUR, PREGSERUM, HCG, HCGQUANT     ABGs: No results found for: PHART, PO2ART, SJM3NXN, RKD0LUX, BEART, N8AVKOEN     Type & Screen (If Applicable):  No results found for: LABABO, LABRH    Drug/Infectious Status (If Applicable):  No results found for: HIV, HEPCAB    COVID-19 Screening (If Applicable): No results found for: COVID19        Anesthesia Evaluation  Patient summary reviewed and Nursing notes reviewed no history of anesthetic complications:   Airway: Mallampati: II  TM distance: >3 FB   Neck ROM: full  Comment: TMJ surgery right side  Mouth opening: > = 3 FB   Dental: normal exam         Pulmonary:       (-) COPD, asthma, rhonchi and wheezes                           Cardiovascular:  Exercise tolerance: good (>4 METS),       (-) CABG/stent, dysrhythmias and  angina      Rhythm: regular  Rate: normal                 ROS comment:  *Department of Cardiology*                            [facility] [place], [place] [address]                              [phone]     -------------------------------------------------------------------   Transthoracic Echocardiography     Patient:     Chen,      :    1970 Height: 63in                Merry   MR #:        [id number]       Gender: F          Weight: 163.7lb   CSN #:       [id number]      Age:    51         BP:     108 / 57                                                          mmHg   Study        2021    BSA:    1.78m^2    HR:   Date/Time:   12:42PM     Ordering Physician: Inna Crawford   Sonographer:         Ducnan Wang     -------------------------------------------------------------------     Procedure:2D COMPLETE ECHO (COLORFLOW/DOPPLER, STRAIN)   Accession [id number]     -------------------------------------------------------------------   Indications:      Syncope.     -------------------------------------------------------------------   Study data:  Elective. Transthoracic echocardiography. Location: [address] [place] [address]. Inpatient room: 229.  Patient status:   Inpatient. Procedure:  Transthoracic echocardiography was performed. Image   quality was adequate. The study was technically limited due to poor   acoustic window availability, excessive abdominal air, lung   artifact, and rib artifact. Scanning was performed from the   parasternal, apical, and subcostal acoustic windows.  M-mode,   complete 2D, complete spectral Doppler, and color Doppler. -------------------------------------------------------------------   CONCLUSIONS     SUMMARY:     1. Left ventricle:  The cavity size was normal. Wall thickness was    normal. Systolic function was normal. The estimated ejection      fraction was in the range of 55% to 60%. Wall motion was normal;      there were no regional wall motion abnormalities. Left      ventricular diastolic function parameters were normal.   2. Right ventricle: The cavity size was normal. Wall thickness was      normal. Systolic function was normal.   3. Pulmonary arteries: Systolic pressure was within the normal      range. Estimated PA peak pressure is 17mm Hg (S). 4. Pericardium, extracardiac: There was no pericardial effusion. Impressions:  No prior study was available for comparison. -------------------------------------------------------------------        Neuro/Psych:      (-) seizures, TIA and CVA           GI/Hepatic/Renal:        (-) GERD      ROS comment: No gerd sx today. Endo/Other:    (+) : arthritis (feet): rheumatoid. , .                 Abdominal:             Vascular:     - DVT and PE. Other Findings:           Anesthesia Plan      general     ASA 2       Induction: intravenous. MIPS: Postoperative opioids intended and Prophylactic antiemetics administered. Anesthetic plan and risks discussed with patient. Plan discussed with CRNA.                     Aron Wilkerson MD   1/20/2023

## 2023-01-20 NOTE — BRIEF OP NOTE
Brief Postoperative Note      Patient: Tono Jorge  YOB: 1970  MRN: [id number]    Date of Procedure: 1/20/2023    Pre-Op Diagnosis: R18.8 RIGHT GROIN SEROMA    Post-Op Diagnosis: Same and incarcerated right femoral hernia       Procedure(s):  OPEN RIGHT GROIN SEROMA DRAINAGE  HERNIA FEMORAL REPAIR WITH MESH    Surgeon(s):  Sandra Petty MD    Assistant:  Surgical Assistant: Savannah Dixon    Anesthesia: General    Estimated Blood Loss (mL): Minimal    Complications: None    Specimens:   * No specimens in log *    Implants:  Implant Name Type Inv. Item Serial No.  Lot No. LRB No. Used Action   MESH LAISHA SM 1X1. 35IN INGUINAL WHT POLYPR MFIL PLUG - [id number]  MESH LAISHA SM 1X1. 35IN INGUINAL WHT POLYPR MFIL PLUG  [place] [employer]- [id number] 1 Implanted         Drains: * No LDAs found *    Findings: Fluid noted, but all secondary to incarcerated femoral hernia, repair completed.     Electronically signed by Sandra Petty MD on 1/20/2023 at 12:06 PM

## 2023-01-20 NOTE — ANESTHESIA POSTPROCEDURE EVALUATION
Department of Anesthesiology  Postprocedure Note    Patient: Margarito Lundborg  MRN: [id number]  YOB: 1970  Date of evaluation: 1/20/2023      Procedure Summary     Date: 01/20/23 Room / Location: [address]    Anesthesia Start: 1124 Anesthesia Stop: 1218    Procedures:       OPEN RIGHT GROIN SEROMA DRAINAGE (Right: Abdomen)      HERNIA FEMORAL REPAIR WITH MESH (Right: Groin) Diagnosis:       Groin fluid collection      (R18.8 RIGHT GROIN SEROMA)    Surgeons: Sabino Davis MD Responsible Provider:     Anesthesia Type: general ASA Status: 2          Anesthesia Type: No value filed.     Compa Phase I: Compa Score: 8    Compa Phase II:        Anesthesia Post Evaluation    Patient location during evaluation: PACU  Patient participation: complete - patient participated  Level of consciousness: awake  Airway patency: patent  Nausea & Vomiting: no vomiting  Complications: no  Cardiovascular status: hemodynamically stable  Respiratory status: acceptable  Hydration status: euvolemic  Multimodal analgesia pain management approach

## 2023-01-21 NOTE — OP NOTE
[facility]                     [address] [facility], [address] [facility]                                OPERATIVE REPORT    PATIENT NAME: Speedy Terrell                    :        1970  MED REC NO:   [id number]                          ROOM:  ACCOUNT NO:   [de-identified]                           ADMIT DATE: 2023  PROVIDER:     Sangeetha Gómze MD    DATE OF PROCEDURE:  2023    PREOPERATIVE DIAGNOSIS:  Right groin seroma. POSTOPERATIVE DIAGNOSES:  1. Right groin seroma. 2.  Incarcerated right femoral hernia. PROCEDURE:  Right groin exploration, seroma drainage, and open repair of  right femoral hernia with mesh. SURGEON:  Sangeetha Gómez MD    ANESTHESIA:  General plus local.    ESTIMATED BLOOD LOSS:  Minimal.    COMPLICATIONS:  None. DETAILS OF SURGERY:  The patient is a 59-year-old female, who has had a  prior inguinal hernia repair on the right side many years ago per Dr. Estephania Garcia. She has had a chronic seroma in this area, which has been  drained previously. Re-presents with accumulation of fluid and swelling  in the area. CT scan and physical exam consistent with a possible  recurrent hernia or seroma in the groin. CT scan showed fluid  collection consistent with seroma and she was brought to the OR today  for exploration. Intended procedure reviewed with the patient. All  questions answered. Antibiotics were administered and she consents to  proceed. FINDINGS:  In terms of findings at surgery, there was fluid noted and  there was dependent fluid in the hernia sac, which was emanating from  the femoral canal consistent with an incarcerated femoral hernia. This  was taken out, reduced and the hernia was repaired. The incision was closed primarily. ADDITIONAL DETAILS OF SURGERY:  The patient was brought to the operating  room, placed on the operative table in supine position. Compression  boots were placed.   General anesthetic was administered. The abdomen  and groin regions were all prepped and draped sterilely and time-out was  done. Local anesthetic was placed liberally around the incisional site. An incision was made in the skin with a 15-blade scalpel. Dissection  was carried down through the skin, subcutaneous tissue, Adalgisa's fascia  and deep subcu was opened. A lobulated fatty tissue mass was elevated  with some fluid and hernia sac as well. This was opened and evacuated  and was noted that this tissue was emanating out of the femoral ring  below the reflection of the external oblique next to the pubic tubercle. This was dissected back and indeed noted to be incarcerated femoral  hernia. As this was dissected further, we were able to then  progressively reduce the hernia and contents fully. The opening was  then reinforced and repaired with a small Bard mesh plug placed into the  defect and then sutured overlying this area with 0-Ethibond sutures,  sealing and closing the small mesh plug into the defect for the repair. The Adalgisa's fascia was closed with interrupted 3-0 Vicryl suture,  subdermal tissue was closed with 3-0 Vicryl suture. Skin was closed  with 4-0 Monocryl suture and Dermabond glue was placed. The patient was  taken to recovery room in stable condition postop.         Alexx Elliott MD    D: 01/20/2023 12:24:43       T: 01/20/2023 12:28:27     [email or web address]  Job#: [id number]     Doc#: [id number]    CC:

## 2023-02-02 ENCOUNTER — OFFICE VISIT (OUTPATIENT)
Dept: SURGERY | Age: 53
End: 2023-02-02

## 2023-02-02 VITALS — WEIGHT: 125 LBS | DIASTOLIC BLOOD PRESSURE: 64 MMHG | SYSTOLIC BLOOD PRESSURE: 100 MMHG | BODY MASS INDEX: 22.14 KG/M2

## 2023-02-02 DIAGNOSIS — R19.09 MASS OF RIGHT INGUINAL REGION: Primary | ICD-10-CM

## 2023-02-02 PROCEDURE — 99024 POSTOP FOLLOW-UP VISIT: CPT | Performed by: SURGERY

## 2023-02-02 NOTE — PROGRESS NOTES
[facility] GENERAL AND LAPAROSCOPIC SURGERY          PATIENT NAME: Maryalice Severin     TODAY'S DATE: 2/2/2023    SUBJECTIVE:    Pt returns post op, had right femoral hernia repair.      OBJECTIVE:  VITALS:  Wt 125 lb (56.7 kg)   LMP 07/07/2020   BMI 22.14 kg/m²     CONSTITUTIONAL:  awake and alert  ABD: repair intact, incision healing well, preop swelling much better  RECTAL: skin tag    Data:      ASSESSMENT AND PLAN:  Healing well from hernia repair  Has anal skin tag - will do office excision under local. Hurts with pressure, hard to wipe clean    Miguel Alas MD

## 2023-02-07 ENCOUNTER — PROCEDURE VISIT (OUTPATIENT)
Dept: SURGERY | Age: 53
End: 2023-02-07
Payer: COMMERCIAL

## 2023-02-07 VITALS — SYSTOLIC BLOOD PRESSURE: 100 MMHG | WEIGHT: 125 LBS | BODY MASS INDEX: 22.14 KG/M2 | DIASTOLIC BLOOD PRESSURE: 64 MMHG

## 2023-02-07 DIAGNOSIS — K64.4 ANAL SKIN TAG: Primary | ICD-10-CM

## 2023-02-07 PROCEDURE — 46220 EXCISE ANAL EXT TAG/PAPILLA: CPT | Performed by: SURGERY

## 2023-02-07 NOTE — PROGRESS NOTES
[facility] GENERAL AND LAPAROSCOPIC SURGERY          PATIENT NAME: Rubia Sena     TODAY'S DATE: 2/7/2023    SUBJECTIVE:    Pt here for excision.   Has tender anal skin tag     OBJECTIVE:  VITALS:  /64   Wt 125 lb (56.7 kg)   LMP 07/07/2020   BMI 22.14 kg/m²     Procedure Note:  Anal area mass  Site confirmed, pt consents to excision  Has had increased size, tenderness locally      Betadine Prep  1% Lido with epi X 6 ml local  #15 blade excision done  Full thickness skin, SQ, and entire lesion removed  Closure completed with 3-0 Vicryl suture ligature  Pt tolerated well  Site hemostatic  DSD placed  Dressing care instructions reviewed with pt  Pathology not sent per pt request    Varun Griffin

## 2023-06-24 ENCOUNTER — APPOINTMENT (OUTPATIENT)
Dept: GENERAL RADIOLOGY | Age: 53
End: 2023-06-24
Payer: COMMERCIAL

## 2023-06-24 ENCOUNTER — HOSPITAL ENCOUNTER (EMERGENCY)
Age: 53
Discharge: HOME | End: 2023-06-24
Payer: COMMERCIAL

## 2023-06-24 VITALS
TEMPERATURE: 97.7 F | DIASTOLIC BLOOD PRESSURE: 70 MMHG | RESPIRATION RATE: 16 BRPM | HEART RATE: 85 BPM | OXYGEN SATURATION: 96 % | SYSTOLIC BLOOD PRESSURE: 111 MMHG

## 2023-06-24 DIAGNOSIS — M25.511 ACUTE PAIN OF RIGHT SHOULDER: Primary | ICD-10-CM

## 2023-06-24 PROCEDURE — 99283 EMERGENCY DEPT VISIT LOW MDM: CPT

## 2023-06-24 PROCEDURE — 6370000000 HC RX 637 (ALT 250 FOR IP): Performed by: PHYSICIAN ASSISTANT

## 2023-06-24 PROCEDURE — 73030 X-RAY EXAM OF SHOULDER: CPT

## 2023-06-24 RX ORDER — METHYLPREDNISOLONE 4 MG/1
2 TABLET ORAL DAILY
Qty: 3 TABLET | Refills: 0 | Status: SHIPPED | OUTPATIENT
Start: 2023-06-24 | End: 2023-06-30

## 2023-06-24 RX ORDER — IBUPROFEN 800 MG/1
800 TABLET ORAL EVERY 8 HOURS PRN
Qty: 20 TABLET | Refills: 0 | Status: SHIPPED | OUTPATIENT
Start: 2023-06-24

## 2023-06-24 RX ORDER — CYCLOBENZAPRINE HCL 10 MG
10 TABLET ORAL ONCE
Status: COMPLETED | OUTPATIENT
Start: 2023-06-24 | End: 2023-06-24

## 2023-06-24 RX ORDER — CYCLOBENZAPRINE HCL 5 MG
5 TABLET ORAL 2 TIMES DAILY PRN
Qty: 10 TABLET | Refills: 0 | Status: SHIPPED | OUTPATIENT
Start: 2023-06-24 | End: 2023-07-04

## 2023-06-24 RX ORDER — HYDROCODONE BITARTRATE AND ACETAMINOPHEN 5; 325 MG/1; MG/1
1 TABLET ORAL ONCE
Status: COMPLETED | OUTPATIENT
Start: 2023-06-24 | End: 2023-06-24

## 2023-06-24 RX ADMIN — CYCLOBENZAPRINE 10 MG: 10 TABLET, FILM COATED ORAL at 10:24

## 2023-06-24 RX ADMIN — HYDROCODONE BITARTRATE AND ACETAMINOPHEN 1 TABLET: 5; 325 TABLET ORAL at 10:29

## 2023-06-24 ASSESSMENT — PAIN SCALES - GENERAL
PAINLEVEL_OUTOF10: 10

## 2023-06-24 ASSESSMENT — PAIN - FUNCTIONAL ASSESSMENT: PAIN_FUNCTIONAL_ASSESSMENT: 0-10

## 2023-06-24 ASSESSMENT — ENCOUNTER SYMPTOMS
NAUSEA: 0
COUGH: 0
DIARRHEA: 0
RHINORRHEA: 0
SHORTNESS OF BREATH: 0
WHEEZING: 0
ABDOMINAL PAIN: 0
VOMITING: 0

## 2023-06-24 ASSESSMENT — PAIN DESCRIPTION - LOCATION: LOCATION: SHOULDER

## 2023-06-24 ASSESSMENT — PAIN DESCRIPTION - PAIN TYPE: TYPE: ACUTE PAIN

## 2023-06-24 ASSESSMENT — PAIN DESCRIPTION - ORIENTATION
ORIENTATION: RIGHT
ORIENTATION: RIGHT

## 2023-06-24 NOTE — ED PROVIDER NOTES
[facility]        Pt Name: Venecia Caldwell  MRN: [id number]  [place] 1970  Date of evaluation: 6/24/2023  Provider: Hillary Ford PA-C  PCP: . None (Inactive)  Note Started: 10:46 AM EDT 6/24/23      NAS. I have evaluated this patient. CHIEF COMPLAINT       Chief Complaint   Patient presents with    Shoulder Pain     States 2 nights ago working nights when she felt her rt shoulder was \"sore\" denies any injury, by the next morning pt was unable to lift her rt arm       HISTORY OF PRESENT ILLNESS: 1 or more Elements     History From: patient   Limitations to history : None    Venecia Caldwell is a 48 y.o. female who presents for evaluation of right shoulder pain x2 days. Patient states that she was at work when it started to feel sore. Took Tylenol and went to bed, woke up the next morning, yesterday morning, and was not able to move the shoulder. States that she is having tingling going down her arm but no weakness or numbness. No history of similar symptoms. No falls injury or trauma. No neck pain or stiffness. She has no other complaints or concerns at this time. Nursing Notes were all reviewed and agreed with or any disagreements were addressed in the HPI. REVIEW OF SYSTEMS :      Review of Systems   Constitutional:  Negative for appetite change, chills and fever. HENT:  Negative for congestion and rhinorrhea. Respiratory:  Negative for cough, shortness of breath and wheezing. Cardiovascular:  Negative for chest pain. Gastrointestinal:  Negative for abdominal pain, diarrhea, nausea and vomiting. Genitourinary:  Negative for difficulty urinating, dysuria and hematuria. Musculoskeletal:  Positive for arthralgias (R shoulder). Negative for neck pain and neck stiffness. Skin:  Negative for rash. Neurological:  Negative for weakness, numbness (tingling) and headaches.      Positives and Pertinent

## (undated) DEVICE — SUTURE VCRL + SZ 3-0 L18IN ABSRB UD SH 1/2 CIR TAPERCUT NDL VCP864D

## (undated) DEVICE — BANDAGE,GAUZE,BULKEE II,4.5"X4.1YD,STRL: Brand: MEDLINE

## (undated) DEVICE — SOLUTION IRRIG 1000ML 0.9% SOD CHL USP POUR PLAS BTL

## (undated) DEVICE — SUTURE VCRL + SZ 3-0 L27IN ABSRB UD L26MM SH 1/2 CIR VCP416H

## (undated) DEVICE — GLOVE SURG SZ 6.5 L11.2IN FNGR THK9.8MIL STRW LTX POLYMER

## (undated) DEVICE — SOLUTION IRRIG 2000ML 0.9% SOD CHL USP UROMATIC PLAS CONT

## (undated) DEVICE — BLADE ES ELASTOMERIC COAT INSUL DURABLE BEND UPTO 90DEG

## (undated) DEVICE — STAPLER SKIN H3.9MM WIRE DIA0.58MM CRWN 6.9MM 35 STPL ROT

## (undated) DEVICE — GOWN SIRUS NONREIN LG W/TWL: Brand: MEDLINE INDUSTRIES, INC.

## (undated) DEVICE — WET SKIN PREP TRAY: Brand: MEDLINE INDUSTRIES, INC.

## (undated) DEVICE — HANDPIECE SET WITH HIGH FLOW TIP AND SUCTION TUBE: Brand: INTERPULSE

## (undated) DEVICE — PENCIL SMK EVAC TELSCP 3 M TBNG

## (undated) DEVICE — SHEET, T, LAPAROTOMY, STERILE: Brand: MEDLINE

## (undated) DEVICE — MERCY FAIRFIELD TURNOVER KIT: Brand: MEDLINE INDUSTRIES, INC.

## (undated) DEVICE — SUTURE VCRL + SZ 2-0 L27IN ABSRB WHT SH 1/2 CIR TAPERCUT VCP417H

## (undated) DEVICE — PAD,ABDOMINAL,8"X10",ST,LF: Brand: MEDLINE

## (undated) DEVICE — SUTURE MCRYL + SZ 4-0 L27IN ABSRB UD L19MM PS-2 3/8 CIR MCP426H

## (undated) DEVICE — Z INACTIVE USE 2855096 SPONGE GZ W4XL4IN 8 PLY 100% COT

## (undated) DEVICE — SUTURE ETHBND EXCEL SZ 0 L18IN NONABSORBABLE GRN L26MM MO-6 CX45D